# Patient Record
Sex: MALE | Race: WHITE | Employment: FULL TIME | URBAN - METROPOLITAN AREA
[De-identification: names, ages, dates, MRNs, and addresses within clinical notes are randomized per-mention and may not be internally consistent; named-entity substitution may affect disease eponyms.]

---

## 2018-07-24 ENCOUNTER — OFFICE VISIT (OUTPATIENT)
Dept: DERMATOLOGY | Facility: CLINIC | Age: 33
End: 2018-07-24
Payer: COMMERCIAL

## 2018-07-24 DIAGNOSIS — D22.9 MULTIPLE BENIGN NEVI: ICD-10-CM

## 2018-07-24 DIAGNOSIS — L43.9 LICHEN PLANUS: Primary | ICD-10-CM

## 2018-07-24 RX ORDER — DESONIDE 0.5 MG/G
OINTMENT TOPICAL 2 TIMES DAILY
COMMUNITY

## 2018-07-24 RX ORDER — TACROLIMUS 1 MG/G
OINTMENT TOPICAL
Qty: 60 G | Refills: 3 | Status: SHIPPED | OUTPATIENT
Start: 2018-07-24

## 2018-07-24 RX ORDER — TACROLIMUS 1 MG/G
OINTMENT TOPICAL
Qty: 60 G | Refills: 3 | Status: SHIPPED | OUTPATIENT
Start: 2018-07-24 | End: 2018-07-24

## 2018-07-24 RX ORDER — FLUTICASONE PROPIONATE 50 MCG
1 SPRAY, SUSPENSION (ML) NASAL DAILY
COMMUNITY

## 2018-07-24 ASSESSMENT — PAIN SCALES - GENERAL: PAINLEVEL: NO PAIN (0)

## 2018-07-24 NOTE — MR AVS SNAPSHOT
After Visit Summary   7/24/2018    Galo Deng    MRN: [id number]           Patient Information     Date Of Birth          1985        Visit Information        Provider Department      7/24/2018 1:30 PM Vin Guthrie MD [facility]        Today's Diagnoses     Lichen planus    -  1    Multiple benign nevi          Care Instructions    The ABCDEs of Melanoma    Skin cancer can develop anywhere on the skin. Ask someone for help when checking your skin, especially in hard to see places. If you notice a mole different from others, or that changes, enlarges, itches, or bleeds (even if it is small), you should see a dermatologist.                          Follow-ups after your visit        Follow-up notes from your care team     Return in about 6 weeks (around 9/4/2018).      Your next 10 appointments already scheduled     Sep 06, 2018  5:15 PM CDT   (Arrive by 5:00 PM)   Return Visit with Vin Guthrie MD   [facility] ([facility])    [address] [place] [id number] [phone]              Who to contact     Please call your clinic at [phone] to:    Ask questions about your health    Make or cancel appointments    Discuss your medicines    Learn about your test results    Speak to your doctor            Additional Information About Your Visit        [employer] Information     [facility] gives you secure access to your electronic health record. If you see a primary care provider, you can also send messages to your care team and make appointments. If you have questions, please call your primary care clinic.  If you do not have a primary care provider, please call [phone] and they will assist you.      [facility] is an electronic gateway that provides easy, online access to your medical records. With [facility], you can request a clinic appointment, read your test results, renew a prescription or communicate with your care  team.     To access your existing account, please contact your [facility] or call [phone] for assistance.        Care EveryWhere ID     This is your Care EveryWhere ID. This could be used by other organizations to access your [place] medical records  [id number]         Blood Pressure from Last 3 Encounters:   No data found for BP    Weight from Last 3 Encounters:   No data found for Wt              Today, you had the following     No orders found for display         Today's Medication Changes          These changes are accurate as of 7/24/18  1:55 PM.  If you have any questions, ask your nurse or doctor.               Start taking these medicines.        Dose/Directions    tacrolimus 0.1 % ointment   Commonly known as:  PROTOPIC   Used for:  Lichen planus   Started by:  Vin Guthrie MD        Apply thin layer 1-2 times per day as needed   Quantity:  60 g   Refills:  3            Where to get your medicines      Some of these will need a paper prescription and others can be bought over the counter.  Ask your nurse if you have questions.     Bring a paper prescription for each of these medications     tacrolimus 0.1 % ointment                Primary Care Provider Fax #    Physician No Ref-Primary [phone]       No address on file        Equal Access to Services     [facility] : Hadii cong armstrong Soestelita, wascotty alcantar, qabruceta kaalmaivan barry, mark obrien . So [facility] [phone].    ATENCIÓN: Si habla español, tiene a falcon disposición servicios gratuitos de asistencia lingüística. Llame al [phone].    We comply with applicable federal civil rights laws and [place] laws. We do not discriminate on the basis of race, color, national origin, age, disability, sex, sexual orientation, or gender identity.            Thank you!     Thank you for choosing [facility]  for your care. Our goal is always to provide you with  excellent care. Hearing back from our patients is one way we can continue to improve our services. Please take a few minutes to complete the written survey that you may receive in the mail after your visit with us. Thank you!             Your Updated Medication List - Protect others around you: Learn how to safely use, store and throw away your medicines at www.disposemymeds.org.          This list is accurate as of 7/24/18  1:55 PM.  Always use your most recent med list.                   Brand Name Dispense Instructions for use Diagnosis    desonide 0.05 % ointment    DESOWEN     Apply topically 2 times daily        fluticasone 50 MCG/ACT spray    FLONASE     Spray 1 spray into both nostrils daily        tacrolimus 0.1 % ointment    PROTOPIC    60 g    Apply thin layer 1-2 times per day as needed    Lichen planus

## 2018-07-24 NOTE — PATIENT INSTRUCTIONS
The ABCDEs of Melanoma    Skin cancer can develop anywhere on the skin. Ask someone for help when checking your skin, especially in hard to see places. If you notice a mole different from others, or that changes, enlarges, itches, or bleeds (even if it is small), you should see a dermatologist.

## 2018-07-24 NOTE — PROGRESS NOTES
[facility] Dermatology Note      Dermatology Problem List:  1.***    Encounter Date: Jul 24, 2018    CC:   Chief Complaint   Patient presents with     Derm Problem     Galo is her for his lichen planis. He said that it first appeared in 2012.          History of Present Illness:  Mr. Galo Deng is a 33 year old male who presents for evaluation of lichen planus of the penis. His current lesions have been present since about June. .     Past Medical History:   There is no problem list on file for this patient.    History reviewed. No pertinent past medical history.  History reviewed. No pertinent surgical history.    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 The patient {denies/admits to:494312} use of tanning beds.    Family History:  ***    Medications:  Current Outpatient Prescriptions   Medication Sig Dispense Refill     desonide (DESOWEN) 0.05 % ointment Apply topically 2 times daily       fluticasone (FLONASE) 50 MCG/ACT spray Spray 1 spray into both nostrils daily          Not on File      Review of Systems:  -{ROS:178267}  -Constitutional: The patient denies fatigue, fevers, chills, unintended weight loss, and night sweats.  -HEENT: Patient denies nonhealing oral sores.  -Skin: As above in HPI. No additional skin concerns.    Physical exam:  Vitals: There were no vitals taken for this visit.  GEN: {RFGeneralAppearance:921017}   SKIN: {Skin Exam:640168}  -{Skin Exam Derm:032894}  -{Skin Exam Derm:235436}  -{Skin Exam Derm:332404}  -No other lesions of concern on areas examined.     Impression/Plan:  1. {Diagnosesderm:224966}    {rfplan:353367}    ***    2. {Diagnosesderm:399485}    {rfplan:456830}    ***    3. {Diagnosesderm:879919}    {rfplan:280706}    ***    4. {Diagnosesderm:689380}    ***    ***      Follow-up {rfmultiple:109362} {follow up in days/weeks/months:362936}.       Dr. Guthrie staffed the patient.    Staff Involved:  Resident(Leeanne Molina)/Staff(as above)

## 2018-07-24 NOTE — PROGRESS NOTES
[facility] Dermatology Note      Dermatology Problem List:  1.Lichen planus  - tacrolimus and desonide ointment  2. Multiple benign nevi    Encounter Date: Jul 24, 2018    CC:   Chief Complaint   Patient presents with     Derm Problem     Galo is her for his lichen planis. He said that it first appeared in 2012.          History of Present Illness:  Mr. Galo Deng is a 33 year old male who presents for evaluation of lichen planus of the penile head. He has had this for several years and [facility] He has used tacrolimus and desonide in the past with good success, using desonide for about 1 week followed by tacrolimus for about 1 month with complete resolution for several years. He reports that hsi current lesions have been present since about June. He notes some burning/discomfort. No drainage or bleeding. He denies any oral involvement. No other skin or nail involvement. He is otherwise feeling well. He also reports that his wife would like his moles examined. No changes in his moles per his knowledge, none are symptomatic.     Past Medical History:   There is no problem list on file for this patient.    History reviewed. No pertinent past medical history.  History reviewed. No pertinent surgical history.    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    Family History:  No family history of melanoma.     Medications:  Current Outpatient Prescriptions   Medication Sig Dispense Refill     desonide (DESOWEN) 0.05 % ointment Apply topically 2 times daily       fluticasone (FLONASE) 50 MCG/ACT spray Spray 1 spray into both nostrils daily          Not on File      Review of Systems:  -As per HPI  -Constitutional: The patient is otherwise feeling well  -HEENT: Patient denies nonhealing oral sores.  -Skin: As above in HPI. No additional skin concerns.    Physical exam:  Vitals: There were no vitals taken for this visit.  GEN:  This is a well developed, well-nourished male in no acute distress, in a pleasant mood.    SKIN: Waist-up skin, which includes the head/face, neck, both arms, chest, back, abdomen, digits and/or nails was examined as well as the groin  -Multiple regular brown pigmented macules and papules are identified on the back, chest, abdomen, upper extremities.   - slightly violaceous macule on the central coronal rim of the penile head as well as one smaller macule near the urethral meatus  - faint white lines on the right buccal mucosa  -No other lesions of concern on areas examined.     Impression/Plan:  1. Genital lichen planus    Start desonide 0.05% ointment BID for 1 week then transition to weekend use    Start tacrolimus ointment BID after the desonide,     Directed him to continue this for 2-3 weeks after clearance to ensure that it stays away    2. Multiple clinically benign nevi    ABCDs of melanoma were discussed and self skin checks were advised. , Sun precaution was advised including the use of sun screens of SPF 30 or higher, sun protective clothing, and avoidance of tanning beds.      Follow-up in 6 weeks, earlier for new or changing lesions.        staffed the patient.    Staff Involved:  Resident(Leeanne Molina)/Staff(as above)    Staff Physician Comments:  I saw and evaluated the patient with the resident and I agree with the assessment and plan as documented in the resident's note.    Vin Guthrie MD  Professor  Head of Dermato-Allergy Division  Department of Dermatology  [facility]

## 2018-07-24 NOTE — NURSING NOTE
Chief Complaint   Patient presents with     Derm Problem     Galo is her for his lichen planis. He said that it first appeared in 2012.        Connie Barrios, EMT

## 2018-07-24 NOTE — LETTER
7/24/2018       RE: Galo Deng  [address]  [facility] [address]     Dear Colleague,    Thank you for referring your patient, Galo Deng, to the [facility] DERMAT[facility] at [facility]. Please see a copy of my visit note below.    [facility] Dermatology Note      Dermatology Problem List:  1.Lichen planus  - tacrolimus and desonide ointment  2. Multiple benign nevi    Encounter Date: Jul 24, 2018    CC:   Chief Complaint   Patient presents with     Derm Problem     Galo is her for his lichen planis. He said that it first appeared in 2012.          History of Present Illness:  Mr. Galo Deng is a 33 year old male who presents for evaluation of lichen planus of the penile head. He has had this for several years and [facility] He has used tacrolimus and desonide in the past with good success, using desonide for about 1 week followed by tacrolimus for about 1 month with complete resolution for several years. He reports that hsi current lesions have been present since about June. He notes some burning/discomfort. No drainage or bleeding. He denies any oral involvement. No other skin or nail involvement. He is otherwise feeling well. He also reports that his wife would like his moles examined. No changes in his moles per his knowledge, none are symptomatic.     Past Medical History:   There is no problem list on file for this patient.    History reviewed. No pertinent past medical history.  History reviewed. No pertinent surgical history.    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    Family History:  No family history of melanoma.     Medications:  Current Outpatient Prescriptions   Medication Sig Dispense Refill     desonide (DESOWEN) 0.05 % ointment Apply topically 2 times daily       fluticasone (FLONASE) 50 MCG/ACT spray Spray 1 spray into both nostrils daily          Not  on File      Review of Systems:  -As per HPI  -Constitutional: The patient is otherwise feeling well  -HEENT: Patient denies nonhealing oral sores.  -Skin: As above in HPI. No additional skin concerns.    Physical exam:  Vitals: There were no vitals taken for this visit.  GEN: This is a well developed, well-nourished male in no acute distress, in a pleasant mood.    SKIN: Waist-up skin, which includes the head/face, neck, both arms, chest, back, abdomen, digits and/or nails was examined as well as the groin  -Multiple regular brown pigmented macules and papules are identified on the back, chest, abdomen, upper extremities.   - slightly violaceous macule on the central coronal rim of the penile head as well as one smaller macule near the urethral meatus  - faint white lines on the right buccal mucosa  -No other lesions of concern on areas examined.     Impression/Plan:  1. Genital lichen planus    Start desonide 0.05% ointment BID for 1 week then transition to weekend use    Start tacrolimus ointment BID after the desonide,     Directed him to continue this for 2-3 weeks after clearance to ensure that it stays away    2. Multiple clinically benign nevi    ABCDs of melanoma were discussed and self skin checks were advised. , Sun precaution was advised including the use of sun screens of SPF 30 or higher, sun protective clothing, and avoidance of tanning beds.      Follow-up in 6 weeks, earlier for new or changing lesions.        staffed the patient.    Staff Involved:  Resident(Leeanne Molina)/Staff(as above)    Staff Physician Comments:  I saw and evaluated the patient with the resident and I agree with the assessment and plan as documented in the resident's note.    Vin Guthrie MD  Professor  Head of Dermato-Allergy Division  Department of Dermatology  [facility]      Again, thank you for allowing me to participate in the care of your patient.      Sincerely,    Vin  MD [place]

## 2018-07-24 NOTE — LETTER
Date:July 25, 2018      Patient was self referred, no letter generated. Do not send.        [facility] Information

## 2023-12-22 ENCOUNTER — LAB REQUISITION (OUTPATIENT)
Dept: LAB | Facility: CLINIC | Age: 38
End: 2023-12-22

## 2023-12-22 PROCEDURE — 86334 IMMUNOFIX E-PHORESIS SERUM: CPT

## 2023-12-22 PROCEDURE — 82595 ASSAY OF CRYOGLOBULIN: CPT

## 2024-01-02 LAB — CRYOGLOB SER QL: ABNORMAL

## 2024-01-03 LAB
ALBUMIN SERPL ELPH-MCNC: NEGATIVE G/DL
CRYOGLOB IGA & IGG & IGM SER-IMP: NORMAL
CRYOGLOB TYP SER IFE: NEGATIVE